# Patient Record
Sex: MALE | Race: WHITE
[De-identification: names, ages, dates, MRNs, and addresses within clinical notes are randomized per-mention and may not be internally consistent; named-entity substitution may affect disease eponyms.]

---

## 2020-04-01 ENCOUNTER — HOSPITAL ENCOUNTER (EMERGENCY)
Dept: HOSPITAL 46 - ED | Age: 60
Discharge: HOME | End: 2020-04-01
Payer: OTHER GOVERNMENT

## 2020-04-01 VITALS — WEIGHT: 192.99 LBS | BODY MASS INDEX: 27.63 KG/M2 | HEIGHT: 70 IN

## 2020-04-01 DIAGNOSIS — Z89.611: ICD-10-CM

## 2020-04-01 DIAGNOSIS — Z79.01: ICD-10-CM

## 2020-04-01 DIAGNOSIS — I10: ICD-10-CM

## 2020-04-01 DIAGNOSIS — I25.2: ICD-10-CM

## 2020-04-01 DIAGNOSIS — Z79.4: ICD-10-CM

## 2020-04-01 DIAGNOSIS — Z79.899: ICD-10-CM

## 2020-04-01 DIAGNOSIS — E11.9: ICD-10-CM

## 2020-04-01 DIAGNOSIS — L03.116: Primary | ICD-10-CM

## 2020-04-01 DIAGNOSIS — Z79.82: ICD-10-CM

## 2020-04-01 DIAGNOSIS — Z87.891: ICD-10-CM

## 2020-04-01 DIAGNOSIS — E78.00: ICD-10-CM

## 2020-04-01 DIAGNOSIS — Z95.1: ICD-10-CM

## 2020-04-01 NOTE — XMS
PreManage Notification: BERNARDO BRONSON MRN:[id number]
 
Security Information
 
Security Events
No recent Security Events currently on file
 
 
 
CRITERIA MET
------------
- History of Sepsis 
 
 
CARE PROVIDERS
-------------------------------------------------------------------------------------
Antonio Briones -         Case or Care Manager     Current
[employer]
 
PHONE: [phone]
-------------------------------------------------------------------------------------
BERNARDO ROSSI     Primary Care     Current
 
PHONE: Unknown
-------------------------------------------------------------------------------------
 
Ethan has no Care Guidelines for this patient.
 
EMICHAEL VISIT COUNT (12 MO.)
-------------------------------------------------------------------------------------
James Escobar
-------------------------------------------------------------------------------------
TOTAL 1
-------------------------------------------------------------------------------------
NOTE: Visits indicate total known visits.
 
ED/UCC VISIT TRACKING (12 MO.)
-------------------------------------------------------------------------------------
04/01/2020 16:26
[facility]
 
TYPE: Emergency
 
COMPLAINT:
- L LEG WEEPING WOUND
-------------------------------------------------------------------------------------
 
 
INPATIENT VISIT TRACKING (12 MO.)
No inpatient visits to display in this time frame
 
[email or web address]

## 2020-06-23 ENCOUNTER — HOSPITAL ENCOUNTER (EMERGENCY)
Dept: HOSPITAL 46 - ED | Age: 60
Discharge: HOME | End: 2020-06-23
Payer: MEDICARE

## 2020-06-23 VITALS — WEIGHT: 192.99 LBS | HEIGHT: 70 IN | BODY MASS INDEX: 27.63 KG/M2

## 2020-06-23 DIAGNOSIS — E11.22: ICD-10-CM

## 2020-06-23 DIAGNOSIS — I25.2: ICD-10-CM

## 2020-06-23 DIAGNOSIS — Z79.82: ICD-10-CM

## 2020-06-23 DIAGNOSIS — Z79.4: ICD-10-CM

## 2020-06-23 DIAGNOSIS — R79.1: ICD-10-CM

## 2020-06-23 DIAGNOSIS — Z79.899: ICD-10-CM

## 2020-06-23 DIAGNOSIS — Z99.2: ICD-10-CM

## 2020-06-23 DIAGNOSIS — N18.9: ICD-10-CM

## 2020-06-23 DIAGNOSIS — Z87.891: ICD-10-CM

## 2020-06-23 DIAGNOSIS — M54.5: Primary | ICD-10-CM

## 2020-06-23 DIAGNOSIS — I12.9: ICD-10-CM

## 2020-06-23 DIAGNOSIS — Z79.01: ICD-10-CM

## 2020-06-23 PROCEDURE — A9270 NON-COVERED ITEM OR SERVICE: HCPCS

## 2020-06-23 NOTE — XMS
PreManage Notification: BERNARDO BRONSON MRN:[id number]
 
Security Information
 
Security Events
No recent Security Events currently on file
 
 
 
CRITERIA MET
------------
- History of Sepsis Dx
 
 
CARE PROVIDERS
-------------------------------------------------------------------------------------
MECHE LARSON     Internal Medicine     04/02/2020-Current
 
PHONE: Unknown
-------------------------------------------------------------------------------------
BERNARDO ROSSI      Physician Assistant     Current
 
PHONE: Unknown
-------------------------------------------------------------------------------------
 
Ethan has no Care Guidelines for this patient.
Care History
Medical/Surgical
04/02/2020    [facility]
\T\middot;\T\nbsp; PATIENT IS A [facility] [place] \T\middot;\T\nbsp; Location:  [address], [place] [address]-
      [phone]
E.D. VISIT COUNT (12 MO.)
-------------------------------------------------------------------------------------
2 [facility]
-------------------------------------------------------------------------------------
TOTAL 2
-------------------------------------------------------------------------------------
NOTE: Visits indicate total known visits.
 
ED/UCC VISIT TRACKING (12 MO.)
-------------------------------------------------------------------------------------
06/23/2020 09:48
[facility]
 
TYPE: Emergency
 
COMPLAINT:
- BACK PAIN
-------------------------------------------------------------------------------------
04/01/2020 16:26
[facility]
 
TYPE: Emergency
 
COMPLAINT:
- L LEG WEEPING WOUND
 
DIAGNOSES:
 
- Cellulitis of left lower limb
- Old myocardial infarction
- Pure hypercholesterolemia, unspecified
- Presence of aortocoronary bypass graft
- Long term (current) use of anticoagulants
- Type 2 diabetes mellitus without complications
- Personal history of nicotine dependence
- Erythematous condition, unspecified
- Essential (primary) hypertension
- Long term (current) use of insulin
- Other long term (current) drug therapy
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
- Long term (current) use of aspirin
-------------------------------------------------------------------------------------
 
 
INPATIENT VISIT TRACKING (12 MO.)
No inpatient visits to display in this time frame
 
[email or web address]

## 2020-11-28 ENCOUNTER — HOSPITAL ENCOUNTER (EMERGENCY)
Dept: HOSPITAL 46 - ED | Age: 60
Discharge: HOME | End: 2020-11-28
Payer: MEDICARE

## 2020-11-28 VITALS — HEIGHT: 70 IN | BODY MASS INDEX: 27.63 KG/M2 | WEIGHT: 193 LBS

## 2020-11-28 DIAGNOSIS — E11.9: ICD-10-CM

## 2020-11-28 DIAGNOSIS — Z79.01: ICD-10-CM

## 2020-11-28 DIAGNOSIS — I10: ICD-10-CM

## 2020-11-28 DIAGNOSIS — R79.1: ICD-10-CM

## 2020-11-28 DIAGNOSIS — R31.9: Primary | ICD-10-CM

## 2020-11-28 DIAGNOSIS — Z79.899: ICD-10-CM

## 2020-11-28 DIAGNOSIS — E78.00: ICD-10-CM

## 2020-11-28 DIAGNOSIS — Z87.891: ICD-10-CM

## 2020-11-28 DIAGNOSIS — Z79.82: ICD-10-CM

## 2020-11-28 DIAGNOSIS — I25.2: ICD-10-CM

## 2020-11-28 NOTE — XMS
PreManage Notification: BERNARDO BRONSON MRN:[id number]
 
Security Information
 
Security Events
No recent Security Events currently on file
 
 
 
CRITERIA MET
------------
- History of Sepsis Dx
 
 
CARE PROVIDERS
-------------------------------------------------------------------------------------
MECHE LARSON     Internal Medicine     04/02/2020-Current
 
PHONE: Unknown
-------------------------------------------------------------------------------------
BERNARDO ROSSI      Physician Assistant     Current
 
PHONE: Unknown
-------------------------------------------------------------------------------------
Karla Rios     /Care Coordinator     11/09/2020-Current
 
PHONE: [phone]
-------------------------------------------------------------------------------------
 
Ethan has no Care Guidelines for this patient.
Care History
Medical/Surgical
04/02/2020    [facility]
\T\middot;\T\nbsp; PATIENT IS A [facility] [place] \T\middot;\T\nbsp; Location: [address], [place] [address]-
      [phone]
EElmerDElmer VISIT COUNT (12 MO.)
-------------------------------------------------------------------------------------
3 [facility]
-------------------------------------------------------------------------------------
TOTAL 3
-------------------------------------------------------------------------------------
NOTE: Visits indicate total known visits.
 
ED/UCC VISIT TRACKING (12 MO.)
-------------------------------------------------------------------------------------
11/28/2020 10:00
[facility]
 
TYPE: Emergency
 
COMPLAINT:
- BLOOD IN URINE
-------------------------------------------------------------------------------------
06/23/2020 09:48
[facility]
 
TYPE: Emergency
 
 
COMPLAINT:
- BACK PAIN/NON INJURY
 
DIAGNOSES:
- Old myocardial infarction
- Long term (current) use of aspirin
- Type 2 diabetes mellitus with diabetic chronic kidney disease
- Hypertensive chronic kidney disease with stage 1 through stage 4 chronic kidney
disease, or unspecified chronic kidney disease
- Low back pain
- Dependence on renal dialysis
- Abnormal coagulation profile
- Long term (current) use of anticoagulants
- Personal history of nicotine dependence
- Long term (current) use of insulin
- Chronic kidney disease, unspecified
- Other long term (current) drug therapy
-------------------------------------------------------------------------------------
04/01/2020 16:26
[facility]
 
TYPE: Emergency
 
COMPLAINT:
- L LEG WEEPING WOUND
 
DIAGNOSES:
- Cellulitis of left lower limb
- Old myocardial infarction
- Pure hypercholesterolemia, unspecified
- Presence of aortocoronary bypass graft
- Long term (current) use of anticoagulants
- Type 2 diabetes mellitus without complications
- Personal history of nicotine dependence
- Erythematous condition, unspecified
- Essential (primary) hypertension
- Long term (current) use of insulin
- Other long term (current) drug therapy
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
- Long term (current) use of aspirin
-------------------------------------------------------------------------------------
 
 
INPATIENT VISIT TRACKING (12 MO.)
No inpatient visits to display in this time frame
 
[email or web address]